# Patient Record
Sex: FEMALE | Race: WHITE | Employment: FULL TIME | URBAN - METROPOLITAN AREA
[De-identification: names, ages, dates, MRNs, and addresses within clinical notes are randomized per-mention and may not be internally consistent; named-entity substitution may affect disease eponyms.]

---

## 2017-10-16 ENCOUNTER — OFFICE VISIT (OUTPATIENT)
Dept: FAMILY MEDICINE CLINIC | Age: 32
End: 2017-10-16

## 2017-10-16 VITALS
HEART RATE: 78 BPM | RESPIRATION RATE: 16 BRPM | BODY MASS INDEX: 28.63 KG/M2 | SYSTOLIC BLOOD PRESSURE: 112 MMHG | DIASTOLIC BLOOD PRESSURE: 72 MMHG | HEIGHT: 70 IN | WEIGHT: 200 LBS

## 2017-10-16 DIAGNOSIS — Z00.00 ENCOUNTER FOR MEDICAL EXAMINATION TO ESTABLISH CARE: Primary | ICD-10-CM

## 2017-10-16 DIAGNOSIS — K58.9 IRRITABLE BOWEL SYNDROME, UNSPECIFIED TYPE: ICD-10-CM

## 2017-10-16 DIAGNOSIS — Z31.9 PATIENT DESIRES PREGNANCY: ICD-10-CM

## 2017-10-16 DIAGNOSIS — F41.1 GAD (GENERALIZED ANXIETY DISORDER): ICD-10-CM

## 2017-10-16 PROCEDURE — 99214 OFFICE O/P EST MOD 30 MIN: CPT | Performed by: FAMILY MEDICINE

## 2017-10-16 RX ORDER — NORETHINDRONE ACETATE AND ETHINYL ESTRADIOL 1MG-20(24)
KIT ORAL
COMMUNITY
Start: 2017-09-22

## 2017-10-16 RX ORDER — BUSPIRONE HYDROCHLORIDE 7.5 MG/1
7.5 TABLET ORAL 2 TIMES DAILY
Qty: 60 TABLET | Refills: 2 | Status: SHIPPED | OUTPATIENT
Start: 2017-10-16 | End: 2018-01-13 | Stop reason: SDUPTHER

## 2017-10-16 ASSESSMENT — ENCOUNTER SYMPTOMS
SHORTNESS OF BREATH: 0
ABDOMINAL PAIN: 0
CONSTIPATION: 0
DIARRHEA: 0
SORE THROAT: 0
WHEEZING: 0
RHINORRHEA: 0
COUGH: 0

## 2017-10-16 NOTE — PROGRESS NOTES
No current facility-administered medications for this visit. ROS:  Review of Systems   Constitutional: Negative for chills and fever. HENT: Negative for rhinorrhea and sore throat. Respiratory: Negative for cough, shortness of breath and wheezing. Gastrointestinal: Negative for abdominal pain, constipation and diarrhea. Endocrine: Negative for polydipsia and polyuria. Genitourinary: Negative for dysuria, frequency and urgency. Neurological: Negative for syncope, light-headedness, numbness and headaches. Psychiatric/Behavioral: Negative for sleep disturbance. The patient is not nervous/anxious. Vitals:    10/16/17 1023   BP: 112/72   Site: Right Arm   Position: Sitting   Cuff Size: Medium Adult   Pulse: 78   Resp: 16   Weight: 200 lb (90.7 kg)   Height: 5' 9.5\" (1.765 m)       Physical exam:  Physical Exam   Constitutional: She is oriented to person, place, and time. She appears well-developed and well-nourished. No distress. HENT:   Head: Normocephalic and atraumatic. Mouth/Throat: No oropharyngeal exudate. Eyes: EOM are normal.   Neck: Normal range of motion. No thyromegaly present. Cardiovascular: Normal rate, regular rhythm and normal heart sounds. No murmur heard. Pulmonary/Chest: Effort normal and breath sounds normal. No respiratory distress. She has no wheezes. Abdominal: Soft. She exhibits no distension. There is no tenderness. There is no rebound and no guarding. Musculoskeletal: She exhibits no edema. Lymphadenopathy:     She has no cervical adenopathy. Neurological: She is alert and oriented to person, place, and time. Skin: Skin is warm and dry. Psychiatric: She has a normal mood and affect. Her behavior is normal.   Vitals reviewed. Assessment/Plan:  28 y.o. female here mainly for Anxiety:  - anxiety: went over multiple options and discussed risk due to anxiety vs medications on a fetus. Agreed on trial of buspar.  She will start taking

## 2018-01-13 DIAGNOSIS — F41.1 GAD (GENERALIZED ANXIETY DISORDER): ICD-10-CM

## 2018-01-15 RX ORDER — BUSPIRONE HYDROCHLORIDE 7.5 MG/1
TABLET ORAL
Qty: 60 TABLET | Refills: 2 | Status: SHIPPED | OUTPATIENT
Start: 2018-01-15 | End: 2018-05-14 | Stop reason: SDUPTHER

## 2018-05-14 DIAGNOSIS — F41.1 GAD (GENERALIZED ANXIETY DISORDER): ICD-10-CM

## 2018-05-14 RX ORDER — BUSPIRONE HYDROCHLORIDE 7.5 MG/1
TABLET ORAL
Qty: 60 TABLET | Refills: 2 | Status: SHIPPED | OUTPATIENT
Start: 2018-05-14

## 2020-07-30 ENCOUNTER — VIRTUAL VISIT (OUTPATIENT)
Dept: INTERNAL MEDICINE | Age: 35
End: 2020-07-30
Payer: COMMERCIAL

## 2020-07-30 ENCOUNTER — TELEPHONE (OUTPATIENT)
Dept: ADMINISTRATIVE | Age: 35
End: 2020-07-30

## 2020-07-30 ENCOUNTER — NURSE TRIAGE (OUTPATIENT)
Dept: OTHER | Facility: CLINIC | Age: 35
End: 2020-07-30

## 2020-07-30 PROCEDURE — 99214 OFFICE O/P EST MOD 30 MIN: CPT | Performed by: PHYSICIAN ASSISTANT

## 2020-07-30 NOTE — TELEPHONE ENCOUNTER
work. Pt states she has had some palpitations, sob and anxiety when the dizziness starts. Describes dizziness as moderate when present. Denies breathing difficulty or chest pain at this time. Second level triage referred to Nelida Lee NP, she is recommending patient be seen today or tomorrow. Informed of disposition. Care advice as documented. Instructed to call back with worsening symptoms. Soft transfer to [facility]) to schedule appointment as recommended. Please do not respond to the triage nurse through this encounter. Any subsequent communication should be directly with the patient.

## 2020-07-30 NOTE — PROGRESS NOTES
2020    TELEHEALTH EVALUATION -- Audio/Visual (During RSQGA-01 public health emergency)    Due to Sergio 19 outbreak, patient's office visit was converted to a virtual visit. Patient was contacted and agreed to proceed with a virtual visit via Doxy. me  The risks and benefits of converting to a virtual visit were discussed in light of the current infectious disease epidemic. Patient also understood that insurance coverage and co-pays are up to their individual insurance plans. HPI:    Williamamor Poppy (:  1985) has requested an audio/video evaluation for the following concern(s):    Chief Complaint   Patient presents with    Dizziness     episodic for one month          Dizziness , episodic x 1 month  She was hypoglycemia while pregnant, glucose has been good   B/p has been good   Jaw tingling while dizzy  This has occurred 6-7 times over the last month   Has IBS-D, and this occurs after she is having a bout of IBS, sometimes 405 episodes of diarrhea   Denies blood in the stool   Started  vitamins, felt a little better   Menses are also heavy    Aug 2019 -  had a baby     Review of Systems   Cardiovascular: Negative. Gastrointestinal: Positive for abdominal pain and diarrhea. Negative for blood in stool, constipation, nausea, rectal pain and vomiting. Genitourinary: Positive for menstrual problem. Negative for decreased urine volume, dysuria, pelvic pain and urgency. Neurological: Positive for dizziness and light-headedness. Negative for tremors, seizures, syncope, speech difficulty, numbness and headaches. Prior to Visit Medications    Medication Sig Taking?  Authorizing Provider   busPIRone (BUSPAR) 7.5 MG tablet take 1 tablet by mouth twice a day  Casa Waldrop MD   BLISOVI 24 FE 1-20 MG-MCG(24) TABS   Historical Provider, MD       Social History     Tobacco Use    Smoking status: Never Smoker    Smokeless tobacco: Never Used   Substance Use Topics    Alcohol use: Not on file    Drug use: Not on file        Allergies   Allergen Reactions    Amoxicillin Rash and Nausea And Vomiting   ,   Past Medical History:   Diagnosis Date    IBS (irritable bowel syndrome)     Lactose intolerance    ,   Past Surgical History:   Procedure Laterality Date    LAPAROSCOPY  2010    WISDOM TOOTH EXTRACTION  2001       PHYSICAL EXAMINATION:  [ INSTRUCTIONS:  \"[x]\" Indicates a positive item  \"[]\" Indicates a negative item  -- DELETE ALL ITEMS NOT EXAMINED]  [x] Alert  [x] Oriented to person/place/time    [x] No apparent distress  [] Toxic appearing    [] Face flushed appearing [] Sclera clear  [] Lips are cyanotic      [x] Breathing appears normal  [] Appears tachypneic      [] Rash on visible skin    [x] Cranial Nerves II-XII grossly intact    [x] Motor grossly intact in visible upper extremities    [x] Motor grossly intact in visible lower extremities    [x] Normal Mood  [] Anxious appearing    [] Depressed appearing  [] Confused appearing      [] Poor short term memory  [] Poor long term memory    [] OTHER:      Due to this being a TeleHealth encounter, evaluation of the following organ systems is limited: Vitals/Constitutional/EENT/Resp/CV/GI//MS/Neuro/Skin/Heme-Lymph-Imm. ASSESSMENT/PLAN:  1. Episode of dizziness  - advised hydration, rest   - will get labs to check for deficiencies, anemia , electrolytes   - CBC Auto Differential; Future  - Comprehensive Metabolic Panel; Future  - Vitamin B12 & Folate; Future      No follow-ups on file. An  electronic signature was used to authenticate this note.     --JUSTIN David on 7/31/2020 at 10:51 AM        Pursuant to the emergency declaration under the [facility], [facility] waiver authority and the [employer] and [employer] Act, this Virtual  Visit was conducted, with patient's consent, to reduce the patient's risk of exposure to COVID-19 and provide continuity of care for an established patient. Services were provided through a video synchronous discussion virtually to substitute for in-person clinic visit.

## 2020-07-31 DIAGNOSIS — R42 EPISODE OF DIZZINESS: ICD-10-CM

## 2020-07-31 LAB
ALBUMIN SERPL-MCNC: 4.1 G/DL (ref 3.5–4.6)
ALP BLD-CCNC: 76 U/L (ref 40–130)
ALT SERPL-CCNC: 18 U/L (ref 0–33)
ANION GAP SERPL CALCULATED.3IONS-SCNC: 12 MEQ/L (ref 9–15)
AST SERPL-CCNC: 19 U/L (ref 0–35)
BASOPHILS ABSOLUTE: 0.1 K/UL (ref 0–0.2)
BASOPHILS RELATIVE PERCENT: 0.8 %
BILIRUB SERPL-MCNC: 0.5 MG/DL (ref 0.2–0.7)
BUN BLDV-MCNC: 9 MG/DL (ref 6–20)
CALCIUM SERPL-MCNC: 8.7 MG/DL (ref 8.5–9.9)
CHLORIDE BLD-SCNC: 96 MEQ/L (ref 95–107)
CO2: 28 MEQ/L (ref 20–31)
CREAT SERPL-MCNC: 0.68 MG/DL (ref 0.5–0.9)
EOSINOPHILS ABSOLUTE: 0.5 K/UL (ref 0–0.7)
EOSINOPHILS RELATIVE PERCENT: 6.7 %
FOLATE: 13.6 NG/ML (ref 7.3–26.1)
GFR AFRICAN AMERICAN: >60
GFR NON-AFRICAN AMERICAN: >60
GLOBULIN: 3 G/DL (ref 2.3–3.5)
GLUCOSE BLD-MCNC: 83 MG/DL (ref 70–99)
HCT VFR BLD CALC: 42.9 % (ref 37–47)
HEMOGLOBIN: 13.9 G/DL (ref 12–16)
LYMPHOCYTES ABSOLUTE: 2.6 K/UL (ref 1–4.8)
LYMPHOCYTES RELATIVE PERCENT: 34.3 %
MCH RBC QN AUTO: 29.7 PG (ref 27–31.3)
MCHC RBC AUTO-ENTMCNC: 32.4 % (ref 33–37)
MCV RBC AUTO: 91.7 FL (ref 82–100)
MONOCYTES ABSOLUTE: 0.5 K/UL (ref 0.2–0.8)
MONOCYTES RELATIVE PERCENT: 6.7 %
NEUTROPHILS ABSOLUTE: 3.9 K/UL (ref 1.4–6.5)
NEUTROPHILS RELATIVE PERCENT: 51.5 %
PDW BLD-RTO: 14.4 % (ref 11.5–14.5)
PLATELET # BLD: 223 K/UL (ref 130–400)
POTASSIUM SERPL-SCNC: 4.1 MEQ/L (ref 3.4–4.9)
RBC # BLD: 4.68 M/UL (ref 4.2–5.4)
SODIUM BLD-SCNC: 136 MEQ/L (ref 135–144)
TOTAL PROTEIN: 7.1 G/DL (ref 6.3–8)
VITAMIN B-12: 499 PG/ML (ref 232–1245)
WBC # BLD: 7.6 K/UL (ref 4.8–10.8)

## 2020-07-31 ASSESSMENT — ENCOUNTER SYMPTOMS
RECTAL PAIN: 0
DIARRHEA: 1
BLOOD IN STOOL: 0
NAUSEA: 0
CONSTIPATION: 0
ABDOMINAL PAIN: 1
VOMITING: 0

## 2023-01-11 ENCOUNTER — OFFICE VISIT (OUTPATIENT)
Dept: INTERNAL MEDICINE | Age: 38
End: 2023-01-11
Payer: COMMERCIAL

## 2023-01-11 VITALS
OXYGEN SATURATION: 98 % | WEIGHT: 218 LBS | HEART RATE: 78 BPM | SYSTOLIC BLOOD PRESSURE: 128 MMHG | DIASTOLIC BLOOD PRESSURE: 88 MMHG | HEIGHT: 70 IN | BODY MASS INDEX: 31.21 KG/M2

## 2023-01-11 DIAGNOSIS — H61.23 BILATERAL IMPACTED CERUMEN: Primary | ICD-10-CM

## 2023-01-11 DIAGNOSIS — H69.83 DYSFUNCTION OF BOTH EUSTACHIAN TUBES: ICD-10-CM

## 2023-01-11 PROCEDURE — 99213 OFFICE O/P EST LOW 20 MIN: CPT | Performed by: FAMILY MEDICINE

## 2023-01-11 PROCEDURE — 69210 REMOVE IMPACTED EAR WAX UNI: CPT | Performed by: FAMILY MEDICINE

## 2023-01-11 SDOH — ECONOMIC STABILITY: FOOD INSECURITY: WITHIN THE PAST 12 MONTHS, THE FOOD YOU BOUGHT JUST DIDN'T LAST AND YOU DIDN'T HAVE MONEY TO GET MORE.: NEVER TRUE

## 2023-01-11 SDOH — ECONOMIC STABILITY: FOOD INSECURITY: WITHIN THE PAST 12 MONTHS, YOU WORRIED THAT YOUR FOOD WOULD RUN OUT BEFORE YOU GOT MONEY TO BUY MORE.: NEVER TRUE

## 2023-01-11 ASSESSMENT — ENCOUNTER SYMPTOMS
CONSTIPATION: 0
COUGH: 0
SORE THROAT: 0
RHINORRHEA: 0
SHORTNESS OF BREATH: 0
ABDOMINAL PAIN: 0
DIARRHEA: 0
WHEEZING: 0

## 2023-01-11 ASSESSMENT — PATIENT HEALTH QUESTIONNAIRE - PHQ9
SUM OF ALL RESPONSES TO PHQ QUESTIONS 1-9: 0
SUM OF ALL RESPONSES TO PHQ QUESTIONS 1-9: 0
2. FEELING DOWN, DEPRESSED OR HOPELESS: 0
SUM OF ALL RESPONSES TO PHQ9 QUESTIONS 1 & 2: 0
SUM OF ALL RESPONSES TO PHQ QUESTIONS 1-9: 0
SUM OF ALL RESPONSES TO PHQ QUESTIONS 1-9: 0
1. LITTLE INTEREST OR PLEASURE IN DOING THINGS: 0

## 2023-01-11 ASSESSMENT — SOCIAL DETERMINANTS OF HEALTH (SDOH): HOW HARD IS IT FOR YOU TO PAY FOR THE VERY BASICS LIKE FOOD, HOUSING, MEDICAL CARE, AND HEATING?: NOT HARD AT ALL

## 2023-01-11 NOTE — PROGRESS NOTES
[address] PRIMARY CARE  [address] [place] [address]  Dept: [phone]  Dept Fax: [phone]: [phone]     Chief Complaint  Chief Complaint   Patient presents with    Otalgia     B/l, worse in left, drainage, can feel fluid, dizziness, tinnitus,  x3 weeks, getting worse in the last 3-4 days    Discuss Medications     If given an antibiotic, is prone to yeast infections       HPI:  40 y. o.female who presents for the following:      Ear pain: L>R; has some drainage and ringing and pain; x3 days; bothering at night; hearing is ok; feels clogged or fullness; had been sick last month with URI and similar symptoms at the time      Review of Systems   Constitutional:  Negative for chills and fever. HENT:  Positive for hearing loss. Negative for congestion, rhinorrhea and sore throat. Respiratory:  Negative for cough, shortness of breath and wheezing. Gastrointestinal:  Negative for abdominal pain, constipation and diarrhea. Endocrine: Negative for polydipsia and polyuria. Genitourinary:  Negative for dysuria, frequency and urgency. Neurological:  Negative for syncope, light-headedness, numbness and headaches. Psychiatric/Behavioral:  Negative for sleep disturbance. The patient is not nervous/anxious. Past Medical History:   Diagnosis Date    Anxiety 2017    IBS (irritable bowel syndrome)     Lactose intolerance      Past Surgical History:   Procedure Laterality Date    LAPAROSCOPY  2010    WISDOM TOOTH EXTRACTION  2001     Social History     Socioeconomic History    Marital status:      Spouse name: Not on file    Number of children: Not on file    Years of education: Not on file    Highest education level: Not on file   Occupational History    Not on file   Tobacco Use    Smoking status: Never    Smokeless tobacco: Never   Substance and Sexual Activity    Alcohol use:  Yes     Alcohol/week: 3.0 - 4.0 standard drinks     Types: 3 - 4 Drinks containing 0.5 oz of alcohol per week    Drug use: Never    Sexual activity: Yes     Partners: Male   Other Topics Concern    Not on file   Social History Narrative    Not on file     Social Determinants of Health     Financial Resource Strain: Low Risk     Difficulty of Paying Living Expenses: Not hard at all   Food Insecurity: No Food Insecurity    Worried About Running Out of Food in the Last Year: Never true    Ran Out of Food in the Last Year: Never true   Transportation Needs: Not on file   Physical Activity: Not on file   Stress: Not on file   Social Connections: Not on file   Intimate Partner Violence: Not on file   Housing Stability: Not on file     Family History   Problem Relation Age of Onset    High Cholesterol Mother     Other Father         Hep C    Cancer Maternal Grandfather         lung    Cancer Paternal Grandmother         Breast    Cancer Paternal Uncle     Substance Abuse Brother       Allergies   Allergen Reactions    Amoxicillin Rash and Nausea And Vomiting     Current Outpatient Medications   Medication Sig Dispense Refill    busPIRone (BUSPAR) 7.5 MG tablet take 1 tablet by mouth twice a day 60 tablet 2    BLISOVI 24 FE 1-20 MG-MCG(24) TABS        No current facility-administered medications for this visit. Vitals:    01/11/23 1358   BP: 128/88   Pulse: 78   SpO2: 98%   Weight: 218 lb (98.9 kg)   Height: 5' 10\" (1.778 m)       Physical exam:  Physical Exam  Vitals reviewed. Constitutional:       General: She is not in acute distress. Appearance: She is well-developed. HENT:      Head: Normocephalic and atraumatic. Right Ear: Tympanic membrane, ear canal and external ear normal. There is impacted cerumen. Tympanic membrane is not scarred, perforated or erythematous. Left Ear: Tympanic membrane, ear canal and external ear normal. There is impacted cerumen. Tympanic membrane is not scarred, perforated or erythematous. Cardiovascular:      Rate and Rhythm: Normal rate. Pulmonary:      Effort: Pulmonary effort is normal. No respiratory distress. Musculoskeletal:      Cervical back: Normal range of motion. Skin:     General: Skin is warm and dry. Neurological:      Mental Status: She is alert and oriented to person, place, and time. Psychiatric:         Attention and Perception: Attention normal.         Behavior: Behavior normal.       Assessment/Plan:  40 y.o. female here mainly for ear problem:  - cerumen impaction cleared; TMs clear; possibly some eustachian tube dysfunction and needs to give this a month; could add on nasal steroid and decongestant on her own if needed     Diagnosis Orders   1. Bilateral impacted cerumen  17685 - ME REMOVE IMPACTED EAR WAX      2. Dysfunction of both eustachian tubes             Return if symptoms worsen or fail to improve.     Geremias Hou MD

## 2023-01-16 ENCOUNTER — TELEPHONE (OUTPATIENT)
Dept: FAMILY MEDICINE CLINIC | Age: 38
End: 2023-01-16

## 2023-01-16 NOTE — TELEPHONE ENCOUNTER
Patient was seen last week for possible ear infection and now has developed a sinus infection over the weekend. Patient is requesting a prescription for an antibiotic.

## 2023-01-17 NOTE — TELEPHONE ENCOUNTER
Based on the findings on exam at our recent visit, it would be highly likely that I would diagnose you with an upper respiratory infection due to a virus in which case no antibiotics would be recommended. If you think something much more significant is happening, then would need to be assessed for the change. Otherwise, would suggest over-the-counter decongestants and hydration while this infection runs it's course.

## 2023-03-15 ENCOUNTER — OFFICE VISIT (OUTPATIENT)
Dept: INTERNAL MEDICINE | Age: 38
End: 2023-03-15
Payer: COMMERCIAL

## 2023-03-15 VITALS
OXYGEN SATURATION: 97 % | HEIGHT: 70 IN | BODY MASS INDEX: 31.07 KG/M2 | TEMPERATURE: 97.9 F | HEART RATE: 90 BPM | WEIGHT: 217 LBS | DIASTOLIC BLOOD PRESSURE: 72 MMHG | SYSTOLIC BLOOD PRESSURE: 110 MMHG

## 2023-03-15 DIAGNOSIS — J06.9 ACUTE URI: Primary | ICD-10-CM

## 2023-03-15 LAB
INFLUENZA A ANTIBODY: NEGATIVE
INFLUENZA B ANTIBODY: NEGATIVE
Lab: NORMAL
PERFORMING INSTRUMENT: NORMAL
QC PASS/FAIL: NORMAL
SARS-COV-2, POC: NORMAL

## 2023-03-15 PROCEDURE — 87804 INFLUENZA ASSAY W/OPTIC: CPT | Performed by: FAMILY MEDICINE

## 2023-03-15 PROCEDURE — 87426 SARSCOV CORONAVIRUS AG IA: CPT | Performed by: FAMILY MEDICINE

## 2023-03-15 PROCEDURE — 99213 OFFICE O/P EST LOW 20 MIN: CPT | Performed by: FAMILY MEDICINE

## 2023-03-15 RX ORDER — BENZONATATE 100 MG/1
100 CAPSULE ORAL 3 TIMES DAILY PRN
Qty: 21 CAPSULE | Refills: 0 | Status: SHIPPED | OUTPATIENT
Start: 2023-03-15 | End: 2023-03-22

## 2023-03-15 RX ORDER — PSEUDOEPHEDRINE HCL 30 MG
30 TABLET ORAL EVERY 6 HOURS PRN
Qty: 30 TABLET | Refills: 1 | Status: SHIPPED | OUTPATIENT
Start: 2023-03-15

## 2023-03-15 SDOH — ECONOMIC STABILITY: FOOD INSECURITY: WITHIN THE PAST 12 MONTHS, THE FOOD YOU BOUGHT JUST DIDN'T LAST AND YOU DIDN'T HAVE MONEY TO GET MORE.: NEVER TRUE

## 2023-03-15 SDOH — ECONOMIC STABILITY: INCOME INSECURITY: HOW HARD IS IT FOR YOU TO PAY FOR THE VERY BASICS LIKE FOOD, HOUSING, MEDICAL CARE, AND HEATING?: NOT HARD AT ALL

## 2023-03-15 SDOH — ECONOMIC STABILITY: FOOD INSECURITY: WITHIN THE PAST 12 MONTHS, YOU WORRIED THAT YOUR FOOD WOULD RUN OUT BEFORE YOU GOT MONEY TO BUY MORE.: NEVER TRUE

## 2023-03-15 SDOH — ECONOMIC STABILITY: HOUSING INSECURITY
IN THE LAST 12 MONTHS, WAS THERE A TIME WHEN YOU DID NOT HAVE A STEADY PLACE TO SLEEP OR SLEPT IN A SHELTER (INCLUDING NOW)?: NO

## 2023-03-15 ASSESSMENT — ENCOUNTER SYMPTOMS
RHINORRHEA: 0
COUGH: 1
ABDOMINAL PAIN: 0
WHEEZING: 0
SORE THROAT: 0
CONSTIPATION: 0
DIARRHEA: 0
SHORTNESS OF BREATH: 0

## 2023-03-15 NOTE — LETTER
March 15, 2023       Grant Sexton YOB: 1985   [place] 85 Date of Visit:  3/15/2023       To Whom It May Concern:    Please excuse for the absence 3/16/23. If you have any questions or concerns, please don't hesitate to call.     Sincerely,        Victorina Gibson MD

## 2023-03-15 NOTE — PROGRESS NOTES
[address] PRIMARY CARE  [address] [place] [address]  Dept: [phone]  Dept Fax: [phone]: [phone]     Chief Complaint  Chief Complaint   Patient presents with    Cough     X4 days, thick brown/yellow phlegm, at night will choke on mucus, fatigue, chest tightness, sinus drainage        HPI:  40 y. o.female who presents for the following:      URI symptoms: x4 days with cough, increased mucus, poor sleep due to cough; coworker sick last week; no fevers; ears with soreness but this has been chronic; taking dayquil/nyquil; cough is the worst part    Review of Systems   Constitutional:  Negative for chills and fever. HENT:  Positive for congestion. Negative for rhinorrhea and sore throat. Respiratory:  Positive for cough. Negative for shortness of breath and wheezing. Gastrointestinal:  Negative for abdominal pain, constipation and diarrhea. Endocrine: Negative for polydipsia and polyuria. Genitourinary:  Negative for dysuria, frequency and urgency. Neurological:  Negative for syncope, light-headedness, numbness and headaches. Psychiatric/Behavioral:  Negative for sleep disturbance. The patient is not nervous/anxious. Past Medical History:   Diagnosis Date    Anxiety 2017    IBS (irritable bowel syndrome)     Lactose intolerance      Past Surgical History:   Procedure Laterality Date    LAPAROSCOPY  2010    WISDOM TOOTH EXTRACTION  2001     Social History     Socioeconomic History    Marital status:      Spouse name: Not on file    Number of children: Not on file    Years of education: Not on file    Highest education level: Not on file   Occupational History    Not on file   Tobacco Use    Smoking status: Never    Smokeless tobacco: Never   Substance and Sexual Activity    Alcohol use:  Yes     Alcohol/week: 3.0 - 4.0 standard drinks     Types: 3 - 4 Drinks containing 0.5 oz of alcohol per week    Drug use: Never    Sexual activity: Yes     Partners: Male   Other Topics Concern    Not on file   Social History Narrative    Not on file     Social Determinants of Health     Financial Resource Strain: Low Risk     Difficulty of Paying Living Expenses: Not hard at all   Food Insecurity: No Food Insecurity    Worried About [address] in the Last Year: Never true    [address] in the Last Year: Never true   Transportation Needs: Unknown    Lack of Transportation (Medical): Not on file    Lack of Transportation (Non-Medical): No   Physical Activity: Not on file   Stress: Not on file   Social Connections: Not on file   Intimate Partner Violence: Not on file   Housing Stability: Unknown    Unable to Pay for Housing in the Last Year: Not on file    Number of Places Lived in the Last Year: Not on file    Unstable Housing in the Last Year: No     Family History   Problem Relation Age of Onset    High Cholesterol Mother     Other Father         Hep C    Cancer Maternal Grandfather         lung    Cancer Paternal Grandmother         Breast    Cancer Paternal Uncle     Substance Abuse Brother       Allergies   Allergen Reactions    Amoxicillin Rash and Nausea And Vomiting     Current Outpatient Medications   Medication Sig Dispense Refill    benzonatate (TESSALON PERLES) 100 MG capsule Take 1 capsule by mouth 3 times daily as needed for Cough 21 capsule 0    pseudoephedrine (DECONGESTANT) 30 MG tablet Take 1 tablet by mouth every 6 hours as needed for Congestion 30 tablet 1    busPIRone (BUSPAR) 7.5 MG tablet take 1 tablet by mouth twice a day 60 tablet 2    BLISOVI 24 FE 1-20 MG-MCG(24) TABS        No current facility-administered medications for this visit. Vitals:    03/15/23 1316   BP: 110/72   Pulse: 90   Temp: 97.9 °F (36.6 °C)   TempSrc: Infrared   SpO2: 97%   Weight: 217 lb (98.4 kg)   Height: 5' 10\" (1.778 m)       Physical exam:  Physical Exam  Vitals reviewed.    Constitutional: General: She is not in acute distress.     Appearance: She is well-developed.   HENT:      Head: Normocephalic and atraumatic.      Right Ear: Tympanic membrane, ear canal and external ear normal. Tympanic membrane is not erythematous. Tympanic membrane has normal mobility.      Left Ear: Tympanic membrane, ear canal and external ear normal. Tympanic membrane is not erythematous. Tympanic membrane has normal mobility.      Nose: Nose normal.      Mouth/Throat:      Pharynx: No oropharyngeal exudate.   Neck:      Thyroid: No thyromegaly.   Cardiovascular:      Rate and Rhythm: Normal rate and regular rhythm.      Heart sounds: Normal heart sounds. No murmur heard.  Pulmonary:      Effort: Pulmonary effort is normal. No respiratory distress.      Breath sounds: Normal breath sounds. No wheezing.   Abdominal:      General: Bowel sounds are normal. There is no distension.      Palpations: Abdomen is soft.      Tenderness: There is no abdominal tenderness. There is no guarding or rebound.   Musculoskeletal:      Cervical back: Normal range of motion.   Lymphadenopathy:      Cervical: No cervical adenopathy.   Skin:     General: Skin is warm and dry.   Neurological:      Mental Status: She is alert and oriented to person, place, and time.   Psychiatric:         Behavior: Behavior normal.       Assessment/Plan:  37 y.o. female here mainly for URI symptoms:  - URI: benign exam; likely viral URI; discussed supportive care; rapid tests neg     Diagnosis Orders   1. Acute URI  POCT COVID-19, Antigen    POCT Influenza A/B    benzonatate (TESSALON PERLES) 100 MG capsule    pseudoephedrine (DECONGESTANT) 30 MG tablet           Return if symptoms worsen or fail to improve.    Robert Paredes MD

## 2023-10-18 ENCOUNTER — TELEPHONE (OUTPATIENT)
Dept: INTERNAL MEDICINE | Age: 38
End: 2023-10-18

## 2023-10-18 NOTE — TELEPHONE ENCOUNTER
----- Message from Temitope Lama sent at 10/17/2023  2:19 PM EDT -----  Subject: Message to Provider    QUESTIONS  Information for Provider? Patient requests call back to make sure Dr. Darryle Circle is in network with [facility] exchange plan.   ---------------------------------------------------------------------------  --------------  [address]  [phone]; OK to leave message on voicemail  ---------------------------------------------------------------------------  --------------  SCRIPT ANSWERS  Relationship to Patient?  Self

## 2023-10-23 ENCOUNTER — OFFICE VISIT (OUTPATIENT)
Dept: INTERNAL MEDICINE | Age: 38
End: 2023-10-23
Payer: COMMERCIAL

## 2023-10-23 VITALS
TEMPERATURE: 97.4 F | DIASTOLIC BLOOD PRESSURE: 88 MMHG | HEIGHT: 70 IN | OXYGEN SATURATION: 96 % | SYSTOLIC BLOOD PRESSURE: 124 MMHG | BODY MASS INDEX: 30.26 KG/M2 | WEIGHT: 211.4 LBS | RESPIRATION RATE: 16 BRPM | HEART RATE: 85 BPM

## 2023-10-23 DIAGNOSIS — J01.40 ACUTE NON-RECURRENT PANSINUSITIS: Primary | ICD-10-CM

## 2023-10-23 PROCEDURE — 99213 OFFICE O/P EST LOW 20 MIN: CPT | Performed by: NURSE PRACTITIONER

## 2023-10-23 PROCEDURE — 1036F TOBACCO NON-USER: CPT | Performed by: NURSE PRACTITIONER

## 2023-10-23 PROCEDURE — G8427 DOCREV CUR MEDS BY ELIG CLIN: HCPCS | Performed by: NURSE PRACTITIONER

## 2023-10-23 PROCEDURE — G8484 FLU IMMUNIZE NO ADMIN: HCPCS | Performed by: NURSE PRACTITIONER

## 2023-10-23 PROCEDURE — G8417 CALC BMI ABV UP PARAM F/U: HCPCS | Performed by: NURSE PRACTITIONER

## 2023-10-23 RX ORDER — FLUCONAZOLE 150 MG/1
150 TABLET ORAL ONCE
Qty: 1 TABLET | Refills: 0 | Status: SHIPPED | OUTPATIENT
Start: 2023-10-23 | End: 2023-10-23

## 2023-10-23 RX ORDER — DOXYCYCLINE HYCLATE 100 MG
100 TABLET ORAL 2 TIMES DAILY
Qty: 20 TABLET | Refills: 0 | Status: SHIPPED | OUTPATIENT
Start: 2023-10-23 | End: 2023-11-02

## 2023-10-23 NOTE — PROGRESS NOTES
Height: 1.778 m (5' 10\")       Physical Exam  Vitals and nursing note reviewed. Constitutional:       Appearance: She is not ill-appearing. HENT:      Head: Normocephalic and atraumatic. Right Ear: A middle ear effusion is present. Tympanic membrane is bulging. Left Ear: A middle ear effusion is present. Tympanic membrane is bulging. Nose: Mucosal edema and congestion present. Right Turbinates: Swollen. Left Turbinates: Swollen. Right Sinus: Maxillary sinus tenderness present. Left Sinus: Maxillary sinus tenderness present. Mouth/Throat:      Lips: Pink. Mouth: Mucous membranes are moist.      Pharynx: No posterior oropharyngeal erythema. Tonsils: No tonsillar exudate. Eyes:      Conjunctiva/sclera: Conjunctivae normal.   Cardiovascular:      Rate and Rhythm: Normal rate and regular rhythm. Heart sounds: Normal heart sounds. Pulmonary:      Effort: Pulmonary effort is normal. No respiratory distress. Breath sounds: Normal breath sounds. Abdominal:      Palpations: Abdomen is soft. Tenderness: There is no abdominal tenderness. Musculoskeletal:      Cervical back: Normal range of motion and neck supple. No tenderness. Lymphadenopathy:      Cervical: No cervical adenopathy. Skin:     General: Skin is warm and dry. Neurological:      Mental Status: She is alert and oriented to person, place, and time. Psychiatric:         Mood and Affect: Mood normal.         Thought Content: Thought content normal.           ASSESSMENT/ PLAN    1. Acute non-recurrent pansinusitis  Acute. Tx with doxycycline d/t affecting her chest as well. Diflucan given d/t gets antibiotic induced yeast infection, take 1 at onset of therapy and 1 at end of therapy. Continue otc day time med for symptom control.   - doxycycline hyclate (VIBRA-TABS) 100 MG tablet; Take 1 tablet by mouth 2 times daily for 10 days  Dispense: 20 tablet;  Refill: 0  - fluconazole

## 2023-10-24 ENCOUNTER — TELEPHONE (OUTPATIENT)
Dept: INTERNAL MEDICINE | Age: 38
End: 2023-10-24

## 2023-10-24 NOTE — TELEPHONE ENCOUNTER
Called to make patient aware that we do accept the insurance and she informed me she was seen in the office already.

## 2023-10-24 NOTE — TELEPHONE ENCOUNTER
Patient called in stating that she was under the impression you were giving her two tabs of Diflucan. 1 before starting abx and 1 after completing abx. She only received 1 tab at the pharmacy and asking if this is correct?

## 2023-10-25 RX ORDER — FLUCONAZOLE 150 MG/1
150 TABLET ORAL ONCE
Qty: 1 TABLET | Refills: 0 | Status: SHIPPED | OUTPATIENT
Start: 2023-10-25 | End: 2023-10-25

## 2023-10-25 NOTE — TELEPHONE ENCOUNTER
She is absolutely correct. I didn't adjust the quantity on the medication, I have sent another dose over to pharmacy just now. I am so sorry about the inconvenience, but glad she called so could correct it.

## 2023-10-26 NOTE — TELEPHONE ENCOUNTER
Patient aware of Marixa's note. She did state if there is any further questions please call her.     Thank you